# Patient Record
Sex: FEMALE
[De-identification: names, ages, dates, MRNs, and addresses within clinical notes are randomized per-mention and may not be internally consistent; named-entity substitution may affect disease eponyms.]

---

## 2022-06-06 PROBLEM — Z00.00 ENCOUNTER FOR PREVENTIVE HEALTH EXAMINATION: Status: ACTIVE | Noted: 2022-06-06

## 2022-06-16 ENCOUNTER — APPOINTMENT (OUTPATIENT)
Dept: ENDOCRINOLOGY | Facility: CLINIC | Age: 27
End: 2022-06-16
Payer: MEDICAID

## 2022-06-16 VITALS
WEIGHT: 143 LBS | DIASTOLIC BLOOD PRESSURE: 66 MMHG | HEART RATE: 85 BPM | SYSTOLIC BLOOD PRESSURE: 109 MMHG | BODY MASS INDEX: 25.34 KG/M2 | HEIGHT: 63 IN

## 2022-06-16 DIAGNOSIS — D35.2 BENIGN NEOPLASM OF PITUITARY GLAND: ICD-10-CM

## 2022-06-16 DIAGNOSIS — R93.89 ABNORMAL FINDINGS ON DIAGNOSTIC IMAGING OF OTHER SPECIFIED BODY STRUCTURES: ICD-10-CM

## 2022-06-16 DIAGNOSIS — Z83.3 FAMILY HISTORY OF DIABETES MELLITUS: ICD-10-CM

## 2022-06-16 DIAGNOSIS — N91.5 OLIGOMENORRHEA, UNSPECIFIED: ICD-10-CM

## 2022-06-16 DIAGNOSIS — F41.9 ANXIETY DISORDER, UNSPECIFIED: ICD-10-CM

## 2022-06-16 PROCEDURE — 99205 OFFICE O/P NEW HI 60 MIN: CPT

## 2022-06-21 ENCOUNTER — NON-APPOINTMENT (OUTPATIENT)
Age: 27
End: 2022-06-21

## 2022-06-22 LAB
25(OH)D3 SERPL-MCNC: 19.4 NG/ML
ALBUMIN SERPL ELPH-MCNC: 5 G/DL
ALP BLD-CCNC: 78 U/L
ALT SERPL-CCNC: 13 U/L
ANION GAP SERPL CALC-SCNC: 13 MMOL/L
AST SERPL-CCNC: 18 U/L
BASOPHILS # BLD AUTO: 0.04 K/UL
BASOPHILS NFR BLD AUTO: 0.6 %
BILIRUB SERPL-MCNC: 0.4 MG/DL
BUN SERPL-MCNC: 14 MG/DL
CALCIUM SERPL-MCNC: 10 MG/DL
CHLORIDE SERPL-SCNC: 105 MMOL/L
CO2 SERPL-SCNC: 22 MMOL/L
CORTIS SERPL-MCNC: 12.3 UG/DL
CREAT SERPL-MCNC: 0.89 MG/DL
DHEA-S SERPL-MCNC: 264 UG/DL
EGFR: 91 ML/MIN/1.73M2
EOSINOPHIL # BLD AUTO: 0.05 K/UL
EOSINOPHIL NFR BLD AUTO: 0.7 %
ESTRADIOL SERPL-MCNC: 80 PG/ML
FSH SERPL-MCNC: 2.8 IU/L
GLUCOSE SERPL-MCNC: 91 MG/DL
HCT VFR BLD CALC: 40.6 %
HGB BLD-MCNC: 13.5 G/DL
IMM GRANULOCYTES NFR BLD AUTO: 0.3 %
LH SERPL-ACNC: 14.6 IU/L
LYMPHOCYTES # BLD AUTO: 2.59 K/UL
LYMPHOCYTES NFR BLD AUTO: 37.5 %
MAN DIFF?: NORMAL
MCHC RBC-ENTMCNC: 29.5 PG
MCHC RBC-ENTMCNC: 33.3 GM/DL
MCV RBC AUTO: 88.6 FL
MONOCYTES # BLD AUTO: 0.54 K/UL
MONOCYTES NFR BLD AUTO: 7.8 %
NEUTROPHILS # BLD AUTO: 3.66 K/UL
NEUTROPHILS NFR BLD AUTO: 53.1 %
PLATELET # BLD AUTO: 254 K/UL
POTASSIUM SERPL-SCNC: 4.2 MMOL/L
PROLACTIN SERPL-MCNC: 16.7 NG/ML
PROT SERPL-MCNC: 7.9 G/DL
RBC # BLD: 4.58 M/UL
RBC # FLD: 12.5 %
SODIUM SERPL-SCNC: 140 MMOL/L
T3 SERPL-MCNC: 129 NG/DL
T4 FREE SERPL-MCNC: 1.2 NG/DL
T4 SERPL-MCNC: 8.2 UG/DL
TESTOST SERPL-MCNC: 39.4 NG/DL
TSH SERPL-ACNC: 1.8 UIU/ML
VIT B12 SERPL-MCNC: 606 PG/ML
WBC # FLD AUTO: 6.9 K/UL

## 2022-06-23 LAB
IGF-1 INTERP: NORMAL
IGF-I BLD-MCNC: 253 NG/ML
THYROGLOB AB SERPL-ACNC: <20 IU/ML
THYROPEROXIDASE AB SERPL IA-ACNC: <10 IU/ML

## 2022-06-28 LAB
MONOMERIC PROLACTIN (ICMA)*: 12.9 NG/ML
PERCENT MACROPROLACTIN: 22 %
PROLACTIN, SERUM (ICMA)*: 16.6 NG/ML

## 2022-08-12 NOTE — PHYSICAL EXAM
[Alert] : alert [Healthy Appearance] : healthy appearance [No Acute Distress] : no acute distress [Normal Sclera/Conjunctiva] : normal sclera/conjunctiva [Visual Fields Grossly Intact] : visual fields grossly intact [Normal Hearing] : hearing was normal [No Neck Mass] : no neck mass was observed [No LAD] : no lymphadenopathy [Supple] : the neck was supple [Thyroid Not Enlarged] : the thyroid was not enlarged [No Respiratory Distress] : no respiratory distress [Clear to Auscultation] : lungs were clear to auscultation bilaterally [Normal S1, S2] : normal S1 and S2 [Normal Rate] : heart rate was normal [Regular Rhythm] : with a regular rhythm [No Stigmata of Cushings Syndrome] : no stigmata of Cushings Syndrome [Normal Gait] : normal gait [Normal Insight/Judgement] : insight and judgment were intact [Kyphosis] : no kyphosis present [Acanthosis Nigricans] : no acanthosis nigricans [de-identified] : no moon facies, no supraclavicular fat pads

## 2022-08-12 NOTE — DATA REVIEWED
[FreeTextEntry1] : Pituitary MRI (January 2, 2021) reviewed and significant for:\par There is a focal area of increased T2 and decreased T1 weighted signal involving the left posterior aspect of the gland. This is best seen on image 8 of series 6 and image 7 of series 5. This could potentially represent a 2 mm microadenoma. Contrast would be necessary for further evaluation.\par The suprasellar cistern is clear. The optic chiasm is normal the cavernous sinuses are symmetrical.\par The remainder the study is unremarkable.\par IMPRESSION:  Patient will be recalled for completion of this examination. There is a 2 mm focus of signal change in the left posterior aspect of the pituitary potentially a microadenoma.\par \par Thyroid ultrasound (November 5, 2020) reviewed and significant for:\par ISTHMUS: Normal size in AP dimension measuring 0.3 cm.\par RIGHT LOBE:  Measures 4.8 x 1.1 x 2.0 cm in sagittal x AP x transverse dimensions.  Volume 5.5 cc.\par ARCHITECTURE: Slightly heterogeneous without discrete nodule. \par COLOR DOPPLER:  Unremarkable. \par LEFT LOBE:    Measures 5.1 x 1.4 x 2.0 cm in sagittal x AP x transverse dimensions. Volume 7.4 cc.\par ARCHITECTURE: Slightly heterogeneous without discrete nodule. \par COLOR DOPPLER:  Unremarkable. \par IMPRESSION:  Normal thyroid sonogram.

## 2022-08-12 NOTE — HISTORY OF PRESENT ILLNESS
[FreeTextEntry1] : Ms. Nance is a 26 year-old woman presenting to establish care with me for a pituitary microadenoma, oligomenorrhea, and heterogenous thyroid echotexture. \par \par Pituitary microadenoma. \par She was diagnosed with a pituitary microadenoma in January 2021 during evaluation for oligomenorrhea with magnetic resonance imaging demonstrating possible a 2 mm focus of signal change in the left posterior aspect of the pituitary. \par Previous laboratory evaluation not available. \par No family history of endocrine tumors. \par \par Oligomenorrhea.\par She has a long-standing history of oligomenorrhea, with menstrual periods over 35 days in length but never more than 90 days. \par Pelvic ultrasound negative for polycystic ovaries per her report. \par Previous laboratory evaluation not available. \par No acne, hirsutism, or hair loss. \par \par Heterogenous thyroid echotexture.\par She notes intermittent thyroid enlargement when she does not use iodized salt for a period of time.\par Thyroid ultrasound in November 2020 demonstrated heterogenous architecture bilaterally but no discrete nodules.\par She has been biochemically euthyroid per her report.

## 2022-08-12 NOTE — ASSESSMENT
[FreeTextEntry1] : Pituitary microadenoma. She was diagnosed with a pituitary microadenoma in January 2021 during evaluation for oligomenorrhea with magnetic resonance imaging demonstrating a possible 2 mm focus of signal change in the left posterior aspect of the pituitary. Previous laboratory evaluation not available. We will send a pituitary hormone panel as below and obtain interval pituitary imaging.\par \par Oligomenorrhea. She has a long-standing history of oligomenorrhea, with menstrual periods over 35 days in length but never more than 90 days. She does not currently meet criteria for polycystic ovarian syndrome in the absence of polycystic ovaries on imaging or clinical evidence of hyperandrogenism. We will send laboratory evaluation for oligomenorrhea as below. We discussed use of a combined oral contraceptive pill or progesterone as needed in the absence of menstrual bleeding for 90 days.\par \par Heterogenous thyroid echotexture. She notes intermittent thyroid enlargement when she does not use iodized salt for a period of time. Thyroid ultrasound in November 2020 demonstrated heterogenous architecture bilaterally but no discrete nodules. She has been biochemically euthyroid per her report. We will send thyroid function tests, thyroglobulin and thyroid peroxidase antibodies, and obtain interval thyroid ultrasound.

## 2022-08-12 NOTE — ADDENDUM
[FreeTextEntry1] : Recent laboratory results as below; discussed with Ms. Nance. Pituitary hormone levels within range. Evaluation for oligomenorrhea unremarkable. Vitamin D below goal and recommend over-the-counter 1000 intl units daily. Other test results within range. 6/23/22\par \par Recent thyroid ultrasound as below, again demonstrating heterogeneous architecture but no discrete nodules. I was unable to reach Ms. Nance by telephone and will send her a copy of the report for her records. 7/27/22\par \par Recent MRI pituitary as below; discussed with Ms. Nance. Imaging demonstrated a 3 x 4 mm focus of postcontrast hypoenhancement in the pituitary consistent with a pituitary microadenoma. We will continue to monitor. 8/12/22\par \par MRI pituitary without and without contrast (August 10, 2022) reviewed and significant for:\par The pituitary gland demonstrates a 3 x 4 mm focus of T2 hyperintensity in the left side of the gland. Due to timing of the contrast delivery there is minimal hypoenhancement associated with this lesion, which can be seen on series 601 image 10 of the sagittal postcontrast study. The pituitary infundibulum is deviated toward the left.\par Axial T2 FLAIR imaging demonstrates no evidence of additional intracranial abnormalities.\par IMPRESSION:\par 1.   Findings confirm the presence of a 3 x 4 mm focus of T2 hyperintensity and postcontrast hypoenhancement, consistent with a pituitary microadenoma.\par \par Thyroid ultrasound (July 26, 2022) reviewed and significant for:\par ISTHMUS: Normal size in AP dimension measuring 0.2 cm.\par RIGHT LOBE:  Measures 4.8 x 1.2 x 1.8 cm in sagittal x AP x transverse dimensions.  Volume 5.4 cc.\par ARCHITECTURE: Slightly heterogeneous without discrete nodule. \par COLOR DOPPLER:  Unremarkable. \par LEFT LOBE:    Measures 5.0 x 1.2 x 2.3 cm in sagittal x AP x transverse dimensions. Volume 7.5 cc.\par ARCHITECTURE: Slightly heterogeneous without discrete nodule. \par COLOR DOPPLER:  Unremarkable. \par IMPRESSION:  Slight heterogeneity of thyroid echotexture which can be seen in thyroiditis. Correlation with serology is recommended. No discrete nodules identified.